# Patient Record
Sex: FEMALE | Race: WHITE | NOT HISPANIC OR LATINO | URBAN - METROPOLITAN AREA
[De-identification: names, ages, dates, MRNs, and addresses within clinical notes are randomized per-mention and may not be internally consistent; named-entity substitution may affect disease eponyms.]

---

## 2017-09-26 ENCOUNTER — EMERGENCY (EMERGENCY)
Facility: HOSPITAL | Age: 42
LOS: 1 days | Discharge: HOME | End: 2017-09-26
Attending: EMERGENCY MEDICINE | Admitting: EMERGENCY MEDICINE
Payer: COMMERCIAL

## 2017-09-26 VITALS
OXYGEN SATURATION: 100 % | HEART RATE: 69 BPM | RESPIRATION RATE: 16 BRPM | TEMPERATURE: 98 F | DIASTOLIC BLOOD PRESSURE: 76 MMHG | SYSTOLIC BLOOD PRESSURE: 126 MMHG

## 2017-09-26 VITALS
RESPIRATION RATE: 16 BRPM | HEART RATE: 64 BPM | DIASTOLIC BLOOD PRESSURE: 78 MMHG | OXYGEN SATURATION: 100 % | SYSTOLIC BLOOD PRESSURE: 114 MMHG

## 2017-09-26 LAB
ALBUMIN SERPL ELPH-MCNC: 4.3 G/DL — SIGNIFICANT CHANGE UP (ref 3.3–5)
ALP SERPL-CCNC: 40 U/L — SIGNIFICANT CHANGE UP (ref 40–120)
ALT FLD-CCNC: 13 U/L RC — SIGNIFICANT CHANGE UP (ref 10–45)
ANION GAP SERPL CALC-SCNC: 16 MMOL/L — SIGNIFICANT CHANGE UP (ref 5–17)
AST SERPL-CCNC: 20 U/L — SIGNIFICANT CHANGE UP (ref 10–40)
BASOPHILS # BLD AUTO: 0.1 K/UL — SIGNIFICANT CHANGE UP (ref 0–0.2)
BASOPHILS NFR BLD AUTO: 1 % — SIGNIFICANT CHANGE UP (ref 0–2)
BILIRUB SERPL-MCNC: 0.3 MG/DL — SIGNIFICANT CHANGE UP (ref 0.2–1.2)
BUN SERPL-MCNC: 21 MG/DL — SIGNIFICANT CHANGE UP (ref 7–23)
CALCIUM SERPL-MCNC: 9 MG/DL — SIGNIFICANT CHANGE UP (ref 8.4–10.5)
CHLORIDE SERPL-SCNC: 100 MMOL/L — SIGNIFICANT CHANGE UP (ref 96–108)
CO2 SERPL-SCNC: 23 MMOL/L — SIGNIFICANT CHANGE UP (ref 22–31)
CREAT SERPL-MCNC: 0.89 MG/DL — SIGNIFICANT CHANGE UP (ref 0.5–1.3)
EOSINOPHIL # BLD AUTO: 0.1 K/UL — SIGNIFICANT CHANGE UP (ref 0–0.5)
EOSINOPHIL NFR BLD AUTO: 1.1 % — SIGNIFICANT CHANGE UP (ref 0–6)
GLUCOSE SERPL-MCNC: 87 MG/DL — SIGNIFICANT CHANGE UP (ref 70–99)
HCG SERPL QL: NEGATIVE — SIGNIFICANT CHANGE UP
HCT VFR BLD CALC: 36.6 % — SIGNIFICANT CHANGE UP (ref 34.5–45)
HGB BLD-MCNC: 12.8 G/DL — SIGNIFICANT CHANGE UP (ref 11.5–15.5)
LYMPHOCYTES # BLD AUTO: 1.9 K/UL — SIGNIFICANT CHANGE UP (ref 1–3.3)
LYMPHOCYTES # BLD AUTO: 22.3 % — SIGNIFICANT CHANGE UP (ref 13–44)
MCHC RBC-ENTMCNC: 35 PG — HIGH (ref 27–34)
MCHC RBC-ENTMCNC: 35.1 GM/DL — SIGNIFICANT CHANGE UP (ref 32–36)
MCV RBC AUTO: 99.8 FL — SIGNIFICANT CHANGE UP (ref 80–100)
MONOCYTES # BLD AUTO: 0.5 K/UL — SIGNIFICANT CHANGE UP (ref 0–0.9)
MONOCYTES NFR BLD AUTO: 5.9 % — SIGNIFICANT CHANGE UP (ref 2–14)
NEUTROPHILS # BLD AUTO: 6 K/UL — SIGNIFICANT CHANGE UP (ref 1.8–7.4)
NEUTROPHILS NFR BLD AUTO: 69.7 % — SIGNIFICANT CHANGE UP (ref 43–77)
PLATELET # BLD AUTO: 188 K/UL — SIGNIFICANT CHANGE UP (ref 150–400)
POTASSIUM SERPL-MCNC: 3.8 MMOL/L — SIGNIFICANT CHANGE UP (ref 3.5–5.3)
POTASSIUM SERPL-SCNC: 3.8 MMOL/L — SIGNIFICANT CHANGE UP (ref 3.5–5.3)
PROT SERPL-MCNC: 7 G/DL — SIGNIFICANT CHANGE UP (ref 6–8.3)
RBC # BLD: 3.66 M/UL — LOW (ref 3.8–5.2)
RBC # FLD: 10.8 % — SIGNIFICANT CHANGE UP (ref 10.3–14.5)
SODIUM SERPL-SCNC: 139 MMOL/L — SIGNIFICANT CHANGE UP (ref 135–145)
WBC # BLD: 8.6 K/UL — SIGNIFICANT CHANGE UP (ref 3.8–10.5)
WBC # FLD AUTO: 8.6 K/UL — SIGNIFICANT CHANGE UP (ref 3.8–10.5)

## 2017-09-26 PROCEDURE — 99284 EMERGENCY DEPT VISIT MOD MDM: CPT | Mod: 25

## 2017-09-26 PROCEDURE — 84480 ASSAY TRIIODOTHYRONINE (T3): CPT

## 2017-09-26 PROCEDURE — 93005 ELECTROCARDIOGRAM TRACING: CPT

## 2017-09-26 PROCEDURE — 93010 ELECTROCARDIOGRAM REPORT: CPT | Mod: 59

## 2017-09-26 PROCEDURE — 84703 CHORIONIC GONADOTROPIN ASSAY: CPT

## 2017-09-26 PROCEDURE — 85027 COMPLETE CBC AUTOMATED: CPT

## 2017-09-26 PROCEDURE — 84443 ASSAY THYROID STIM HORMONE: CPT

## 2017-09-26 PROCEDURE — 84436 ASSAY OF TOTAL THYROXINE: CPT

## 2017-09-26 PROCEDURE — 99283 EMERGENCY DEPT VISIT LOW MDM: CPT | Mod: 25

## 2017-09-26 PROCEDURE — 80053 COMPREHEN METABOLIC PANEL: CPT

## 2017-09-26 RX ORDER — MECLIZINE HCL 12.5 MG
2 TABLET ORAL
Qty: 42 | Refills: 0 | OUTPATIENT
Start: 2017-09-26 | End: 2017-10-03

## 2017-09-26 NOTE — ED PROVIDER NOTE - NS ED ROS FT
ROS: GENERAL: no fevers, no chills HEENT: no epistaxis, no eye pain, no ear pain, no throat pain CARDIAC: no chest pain, no shortness of breath PULM: no cough, no shortness of breath GI: no nausea, no vomiting, no diarrhea, no abdominal pain, no hematemesis, no bright red blood per rectum : no dysuria, no hematuria EXTREMITIES: no arm pain, no leg pain, no back pain SKIN: no purpura, no petechiae NEURO: +dizziness, no headache, no neck pain, no loss of strength/sensation HEME: no easy bruising, no easy bleeding

## 2017-09-26 NOTE — ED PROVIDER NOTE - PLAN OF CARE
Follow up with your neurologist in 2-3 days or call our clinic at [phone].   Follow up with your medical doctor in 2-3 days or call our clinic at [phone] and state you were seen in the Emergency Department and would like to be seen in clinic.   Take meclizine as needed and directed for your symptoms.  For any pain you may take Tylenol 1 g every six hours and supplement (if allowed by your physician) with ibuprofen 600 mg, with food, every six hours which can be taken three hours apart from the Tylenol to have a layered effect.  Drink at least 2 Liters or 64 Ounces of water each day (UNLESS you are supposed to restrict fluids or have a history of congestive heart failure (CHF)).  Return for any persistent, worsening symptoms, or ANY concerns at all.

## 2017-09-26 NOTE — ED PROVIDER NOTE - ATTENDING CONTRIBUTION TO CARE
Patient with chief complaint of spinning sensation prior to arrival. 1600. Mild nausea. Patient with chief complaint of spinning sensation prior to arrival. 1600. Mild nausea.  CN 2-12 intact, normal coordination, normal finger to nose, normal heel to shin, negative Romberg, no pronator drift, no gait instability, 5/5 strength in upper and lower extremities, normal sensory throughout, alert and oriented to person, place, time, and situation.  NAD, NCAT, MMM, Trachea midline, Normal conjunctiva, CTAB, Non-tachycardic, Normal perfusion, Soft, NTND, No rebound/guarding, No edema, No deformity of extremities, Appropriate, Cooperative, With capacity and insight, No rashes, CN grossly intact, Normal coordination, No focal motor or sensory deficits  will obtain blood work and reassess, thyroid to be followed by callback staff and patient aware.  The patient was re-examined after interventions and is feeling much better.  The patient will follow up with their primary physician this week.

## 2017-09-26 NOTE — ED ADULT NURSE REASSESSMENT NOTE - NS ED NURSE REASSESS COMMENT FT1
Patient appears to be resting comfortably in stretcher; Patient denies chest pain, dizziness, n/v/d, SOB, numbness, tingling; a&ox3; awaiting lab results at this time; safety and comfort measures provided; family at bedside

## 2017-09-26 NOTE — ED ADULT NURSE NOTE - OBJECTIVE STATEMENT
41 y.o female presents to the ed by ambulance s.p episode of near syncope. patient states she was picking up her kids from school, recent diagnosis of her daughter with multiple medical issues (becomes tearful when discussing this), and states she has been under a decent amount of stress unsure if she drank enough water today. no medical history no medications daily, did not hit her head, remembers everything friend helped her to the floor.   Patient undressed and placed into gown, call bell in hand and side rails up for safety. warm blanket provided, vital signs stable, pt in no acute distress.

## 2017-09-26 NOTE — ED PROVIDER NOTE - CARE PLAN
Principal Discharge DX:	Head spinning  Instructions for follow-up, activity and diet:	Follow up with your neurologist in 2-3 days or call our clinic at [phone].   Follow up with your medical doctor in 2-3 days or call our clinic at [phone] and state you were seen in the Emergency Department and would like to be seen in clinic.   Take meclizine as needed and directed for your symptoms.  For any pain you may take Tylenol 1 g every six hours and supplement (if allowed by your physician) with ibuprofen 600 mg, with food, every six hours which can be taken three hours apart from the Tylenol to have a layered effect.  Drink at least 2 Liters or 64 Ounces of water each day (UNLESS you are supposed to restrict fluids or have a history of congestive heart failure (CHF)).  Return for any persistent, worsening symptoms, or ANY concerns at all.

## 2017-09-26 NOTE — ED PROVIDER NOTE - OBJECTIVE STATEMENT
41y Female no PMH complaining of dizziness. Was in a a parking lot with children, felt as if head was spinning for about 5 minutes which gradually improved over the next half hour, and almost passed out. No LOC, went to the ground to prevent injury. Occurred at 4:40pm. No previous history. Felt hot outside, also had to explain daughter's medical condition in her [employer] class. No family history of sudden cardiac death under age 40. Denies fevers, chills, nausea, vomiting, diarrhea, constipation, chest pain, or dyspnea. Felt well before this.    LMP: Last week, normal 41y Female no PMH complaining of dizziness. Was in a a parking lot with children, felt as if head was spinning for about 5 minutes which gradually improved over the next half hour, and almost passed out. No LOC, went to the ground to prevent injury. Occurred at 4:40pm. No previous history. Felt hot outside, also had to explain daughter's medical condition in her [employer] class. No family history of sudden cardiac death under age 40. Denies fevers, chills, nausea, vomiting, diarrhea, constipation, chest pain, or dyspnea. Felt well before this. Also has been taking Arnica [place], 3 tabs yesterday, and 9 tabs today to prevent bruising for a future botox injection, given by dermatologist (took this last year without consequences).    LMP: Last week, normal 41y Female no PMH complaining of dizziness, now resolved. Was in a a parking lot with children, felt as if head was spinning for about 5 minutes which gradually improved over the next half hour, felt near syncopal when this first started. No LOC, went to the ground to prevent injury. Occurred at 4:40pm. No previous history. Felt hot outside, also had to explain daughter's medical condition in her [employer] class. No family history of sudden cardiac death under age 40. Denies fevers, chills, nausea, vomiting, diarrhea, constipation, chest pain, or dyspnea. Felt well before this. Also has been taking Arnica [place], 3 tabs yesterday, and 9 tabs today to prevent bruising for a future botox injection, given by dermatologist (took this last year without consequences).    LMP: Last week, normal

## 2017-09-26 NOTE — ED PROVIDER NOTE - PROGRESS NOTE DETAILS
The patient was re-examined after interventions and is feeling much better.  The patient will follow up with their primary physician this week.   No immediate life threatening issues present on history or clinical exam. Patient is a safe disposition home, has capacity and insight into their condition, ambulatory in the emergency department and will follow up with their doctor(s) this week. Patient  understands anticipatory guidance and was given strict return and follow up precautions. The patient has been informed of all concerning signs and symptoms to return to Emergency Department, the necessity to follow up with PMD/Clinic/follow up provided within 2-3 days was explained, and the patient reports understanding of above with capacity and insight.

## 2017-09-27 LAB
T3 SERPL-MCNC: 121 NG/DL — SIGNIFICANT CHANGE UP (ref 80–200)
T4 AB SER-ACNC: 8.6 UG/DL — SIGNIFICANT CHANGE UP (ref 4.6–12)
TSH SERPL-MCNC: 3.1 UIU/ML — SIGNIFICANT CHANGE UP (ref 0.27–4.2)

## 2021-05-14 ENCOUNTER — NON-APPOINTMENT (OUTPATIENT)
Age: 46
End: 2021-05-14

## 2021-05-14 PROBLEM — Z00.00 ENCOUNTER FOR PREVENTIVE HEALTH EXAMINATION: Status: ACTIVE | Noted: 2021-05-14

## 2021-05-20 ENCOUNTER — APPOINTMENT (OUTPATIENT)
Dept: GASTROENTEROLOGY | Facility: CLINIC | Age: 46
End: 2021-05-20
Payer: COMMERCIAL

## 2021-05-20 VITALS
DIASTOLIC BLOOD PRESSURE: 82 MMHG | HEIGHT: 66 IN | HEART RATE: 78 BPM | SYSTOLIC BLOOD PRESSURE: 135 MMHG | WEIGHT: 130 LBS | BODY MASS INDEX: 20.89 KG/M2

## 2021-05-20 DIAGNOSIS — K21.9 GASTRO-ESOPHAGEAL REFLUX DISEASE W/OUT ESOPHAGITIS: ICD-10-CM

## 2021-05-20 DIAGNOSIS — Z78.9 OTHER SPECIFIED HEALTH STATUS: ICD-10-CM

## 2021-05-20 DIAGNOSIS — K44.9 DIAPHRAGMATIC HERNIA W/OUT OBSTRUCTION OR GANGRENE: ICD-10-CM

## 2021-05-20 PROCEDURE — 99072 ADDL SUPL MATRL&STAF TM PHE: CPT

## 2021-05-20 PROCEDURE — 99204 OFFICE O/P NEW MOD 45 MIN: CPT

## 2021-05-23 PROBLEM — Z78.9 NO KNOWN PROBLEMS: Status: RESOLVED | Noted: 2021-05-20 | Resolved: 2021-05-23

## 2021-05-23 PROBLEM — Z78.9 CAFFEINE USE: Status: ACTIVE | Noted: 2021-05-20

## 2021-05-23 PROBLEM — Z78.9 SOCIAL ALCOHOL USE: Status: ACTIVE | Noted: 2021-05-20

## 2021-05-23 NOTE — HISTORY OF PRESENT ILLNESS
[de-identified] : 46yo female with ?HH\par \par She has prior hc  gerd only prior pregnancy with twins 10 years ago\par Last week was doing abdominal workout with twist and "alien" coming out but she then got heartburn and now on prilosec. She is feeling a little better

## 2021-05-23 NOTE — ASSESSMENT
[FreeTextEntry1] : 44yo female with gerd, ?hiatal hernia\par \par Will add omeprazole 40mg empirically\par will check egd evaluate for eosphagitis and HH\par \par will need eventual colonoscopy\par Risks and benefits of procedure(s) discussed with patient in detail, including but not limited to, perforation, bleeding, reaction to anesthesia, missed lesions.\par

## 2021-05-23 NOTE — PHYSICAL EXAM
[General Appearance - Alert] : alert [General Appearance - In No Acute Distress] : in no acute distress [Auscultation Breath Sounds / Voice Sounds] : lungs were clear to auscultation bilaterally [Heart Rate And Rhythm] : heart rate was normal and rhythm regular [Heart Sounds] : normal S1 and S2 [Heart Sounds Gallop] : no gallops [Murmurs] : no murmurs [Bowel Sounds] : normal bowel sounds [Heart Sounds Pericardial Friction Rub] : no pericardial rub [Abdomen Soft] : soft [Abdomen Tenderness] : non-tender [] : no hepato-splenomegaly [Abdomen Mass (___ Cm)] : no abdominal mass palpated [Abnormal Walk] : normal gait [Nail Clubbing] : no clubbing  or cyanosis of the fingernails [Musculoskeletal - Swelling] : no joint swelling seen [Motor Tone] : muscle strength and tone were normal [Oriented To Time, Place, And Person] : oriented to person, place, and time [Impaired Insight] : insight and judgment were intact [Affect] : the affect was normal

## 2021-06-04 ENCOUNTER — APPOINTMENT (OUTPATIENT)
Dept: GASTROENTEROLOGY | Facility: AMBULATORY MEDICAL SERVICES | Age: 46
End: 2021-06-04
Payer: COMMERCIAL

## 2021-06-04 ENCOUNTER — RESULT REVIEW (OUTPATIENT)
Age: 46
End: 2021-06-04

## 2021-06-04 PROCEDURE — 43239 EGD BIOPSY SINGLE/MULTIPLE: CPT

## 2021-06-14 ENCOUNTER — TRANSCRIPTION ENCOUNTER (OUTPATIENT)
Age: 46
End: 2021-06-14

## 2022-08-28 ENCOUNTER — RX RENEWAL (OUTPATIENT)
Age: 47
End: 2022-08-28

## 2022-08-28 RX ORDER — OMEPRAZOLE 40 MG/1
40 CAPSULE, DELAYED RELEASE ORAL
Qty: 30 | Refills: 0 | Status: ACTIVE | COMMUNITY
Start: 2021-05-20 | End: 1900-01-01

## 2025-07-01 ENCOUNTER — APPOINTMENT (OUTPATIENT)
Dept: ULTRASOUND IMAGING | Facility: CLINIC | Age: 50
End: 2025-07-01